# Patient Record
Sex: FEMALE | Race: WHITE
[De-identification: names, ages, dates, MRNs, and addresses within clinical notes are randomized per-mention and may not be internally consistent; named-entity substitution may affect disease eponyms.]

---

## 2017-06-17 ENCOUNTER — HEALTH MAINTENANCE LETTER (OUTPATIENT)
Age: 37
End: 2017-06-17

## 2021-06-24 ENCOUNTER — RECORDS - HEALTHEAST (OUTPATIENT)
Dept: ADMINISTRATIVE | Facility: CLINIC | Age: 41
End: 2021-06-24

## 2024-10-26 ENCOUNTER — OFFICE VISIT (OUTPATIENT)
Dept: URGENT CARE | Facility: URGENT CARE | Age: 44
End: 2024-10-26

## 2024-10-26 VITALS
TEMPERATURE: 97 F | WEIGHT: 171 LBS | HEART RATE: 71 BPM | SYSTOLIC BLOOD PRESSURE: 103 MMHG | RESPIRATION RATE: 16 BRPM | DIASTOLIC BLOOD PRESSURE: 66 MMHG | OXYGEN SATURATION: 100 %

## 2024-10-26 DIAGNOSIS — R39.9 UTI SYMPTOMS: Primary | ICD-10-CM

## 2024-10-26 DIAGNOSIS — N89.8 VAGINAL ITCHING: ICD-10-CM

## 2024-10-26 LAB
ALBUMIN UR-MCNC: NEGATIVE MG/DL
APPEARANCE UR: CLEAR
BILIRUB UR QL STRIP: NEGATIVE
CLUE CELLS: ABNORMAL
COLOR UR AUTO: YELLOW
GLUCOSE UR STRIP-MCNC: NEGATIVE MG/DL
HGB UR QL STRIP: NEGATIVE
KETONES UR STRIP-MCNC: NEGATIVE MG/DL
LEUKOCYTE ESTERASE UR QL STRIP: NEGATIVE
NITRATE UR QL: NEGATIVE
PH UR STRIP: 6 [PH] (ref 5–7)
SP GR UR STRIP: 1.02 (ref 1–1.03)
TRICHOMONAS, WET PREP: ABNORMAL
UROBILINOGEN UR STRIP-ACNC: 0.2 E.U./DL
WBC'S/HIGH POWER FIELD, WET PREP: ABNORMAL
YEAST, WET PREP: ABNORMAL

## 2024-10-26 PROCEDURE — 87210 SMEAR WET MOUNT SALINE/INK: CPT | Performed by: FAMILY MEDICINE

## 2024-10-26 PROCEDURE — 81003 URINALYSIS AUTO W/O SCOPE: CPT | Performed by: FAMILY MEDICINE

## 2024-10-26 PROCEDURE — 99203 OFFICE O/P NEW LOW 30 MIN: CPT | Performed by: FAMILY MEDICINE

## 2024-10-26 RX ORDER — MICONAZOLE NITRATE 20 MG/G
CREAM TOPICAL 2 TIMES DAILY
Qty: 15 G | Refills: 0 | Status: SHIPPED | OUTPATIENT
Start: 2024-10-26

## 2024-10-26 NOTE — PROGRESS NOTES
Arlene was seen today for uti.    Diagnoses and all orders for this visit:    UTI symptoms  -     UA Macroscopic with reflex to Microscopic and Culture - Lab Collect; Future  -     UA Macroscopic with reflex to Microscopic and Culture - Lab Collect    Vaginal itching  -     Wet prep - Clinic Collect  -     miconazole (MICATIN) 2 % external cream; Apply topically 2 times daily. Apply topically twice daily until symptoms resolved    Patient reassured no UTI present.  I am going to treat for possible candidal infection.  Patient prefers topical treatment.  If not improvement, I recommend consult with GYN.      Subjective   Arlene Russell is a 44 year old is presenting for the following health issues:  Frequency when needs to urinate has burning pressure, vaginal itching, cramping       HPI : Arlene Russell went backpacking a couple of weeks ago and she had some bladder symptoms.  She was having a burning pressure with urination.  Symptoms got a little better then got worse. She does drink a lot of water so urinates frequently.  She does have history of genital herpes and takes acyclovir with no outbreak for awhile.    She has had some itching outside vagina and feels raw starting 1 1/2 weeks ago.  She used some metronidazole topically but symptoms persisted.  Yesterday started with a white vaginal discharge.  She also has burning / pressure with urination.    No risk pregnancy.  No new sexual partners.  Had STI testing after relationship started.      Review of Systems: No fevers/ chills / abdominal or flank pain.      There is no problem list on file for this patient.             Objective:  /66   Pulse 71   Temp 97  F (36.1  C) (Tympanic)   Resp 16   Wt 77.6 kg (171 lb)   LMP 09/29/2024   SpO2 100%  No acute distress.  : Normal female genitalia.  No obvious lesions.  No vaginal discharge.      Results for orders placed or performed in visit on 10/26/24 (from the past 24 hours)   UA Macroscopic  Pt awake, alert, appropriate, resting in bed with mother at bedside. VS documented. EEG tech at bedside. Awaiting further orders, will continue to monitor. with reflex to Microscopic and Culture - Lab Collect    Specimen: Urine, Midstream   Result Value Ref Range    Color Urine Yellow Colorless, Straw, Light Yellow, Yellow    Appearance Urine Clear Clear    Glucose Urine Negative Negative mg/dL    Bilirubin Urine Negative Negative    Ketones Urine Negative Negative mg/dL    Specific Gravity Urine 1.020 1.003 - 1.035    Blood Urine Negative Negative    pH Urine 6.0 5.0 - 7.0    Protein Albumin Urine Negative Negative mg/dL    Urobilinogen Urine 0.2 0.2, 1.0 E.U./dL    Nitrite Urine Negative Negative    Leukocyte Esterase Urine Negative Negative    Narrative    Microscopic not indicated   Wet prep - Clinic Collect    Specimen: Vagina; Swab   Result Value Ref Range    Trichomonas Absent Absent    Yeast Absent Absent    Clue Cells Absent Absent    WBCs/high power field 1+ (A) None           Alma Delia Perez MD

## 2024-12-22 ENCOUNTER — HEALTH MAINTENANCE LETTER (OUTPATIENT)
Age: 44
End: 2024-12-22